# Patient Record
(demographics unavailable — no encounter records)

---

## 2024-10-09 NOTE — DISCUSSION/SUMMARY
[FreeTextEntry1] : Fatigue Probably secondary to the patient's sleep apnea.  Cardiac sarcoidosis Minimal disease demonstrated by cardiac MRI 11/2022.  Cardiomyopathy Continue carvedilol, Entresto, spironolactone, and Farxiga   paroxysmal atrial fibrillation.   Continue anticoagulation.  The patient was instructed to call his electrophysiologist to determine if his amiodarone should be restarted.  Amiodarone was discontinued by CHF  RTO 6 months  [EKG obtained to assist in diagnosis and management of assessed problem(s)] : EKG obtained to assist in diagnosis and management of assessed problem(s)

## 2024-10-09 NOTE — HISTORY OF PRESENT ILLNESS
[FreeTextEntry1] : The patient is a 61-year-old white male with a past medical history remarkable for pulmonary and cardiac sarcoidosis, paroxysmal atrial fibrillation, hypertension, hypercholesterolemia, diabetes, and sleep apnea. Due to the patient's fatigue his carvedilol dose was decreased.  His carvedilol dose was decreased.  Laboratories were ordered.  The patient's T3 and T4 were normal.  His electrolytes and CBC were unremarkable aside from glucose intolerance.   The patient's fatigue continues.  He is chest pain and dyspnea free

## 2024-10-09 NOTE — CARDIOLOGY SUMMARY
[de-identified] : Normal sinus rhythm, increased RS ratio V2 [de-identified] : - Sarcoidosis: Pulmonary 2005 and cardiac 2009 - Nonischemic cardiomyopathy: CHF Con recommended right and left cardiac catheterization 5/31/2024 - PHARMACOLOGIC NUCLEAR STRESS TEST: 12/13/2022, nonischemic cardiomyopathy, EF 47% - Cardiac MRI: 11/10/2022, cardiac sarcoidosis with minimal active disease, EF43%, trivial aortic insufficiency,  no significant mitral regurgitation or tricuspid regurgitation - ECHOCARDIOGRAM: 10/4/2023, EF 45 to 50%, trace mitral regurgitation and tricuspid regurgitation, PA systolic 35   - Silicosis of the lung - AICD: 2009 [place], lead fx/Generator change BS single chamber AICD. Reported MRI compatible - hypertension - hypercholesterolemia - PAF: h/o ablation, placed on amiodarone due to recurrent AF - diabetes - EBONIE

## 2025-04-07 NOTE — CARDIOLOGY SUMMARY
[de-identified] : Normal sinus rhythm,, PVCs  [de-identified] : - Sarcoidosis: Pulmonary 2005 and cardiac 2009 - Nonischemic cardiomyopathy: CHF consultation recommended right and left cardiac catheterization 5/31/2024, declined by the patient - PHARMACOLOGIC NUCLEAR STRESS TEST: 12/13/2022, nonischemic cardiomyopathy, EF 47% - Cardiac MRI: 11/10/2022, cardiac sarcoidosis with minimal active disease, EF43%, trivial aortic insufficiency,  no significant mitral regurgitation or tricuspid regurgitation - ECHOCARDIOGRAM: 10/4/2023, EF 45 to 50%, trace mitral regurgitation and tricuspid regurgitation, PA systolic 35   - Silicosis of the lung - AICD: 2009 [place], lead fx/Generator change BS single chamber AICD. Reported MRI compatible - hypertension - hypercholesterolemia - PAF: h/o ablation, placed on amiodarone due to recurrent AF - diabetes - EBONIE

## 2025-04-07 NOTE — HISTORY OF PRESENT ILLNESS
[FreeTextEntry1] : The patient is a 61-year-old white male with a past medical history remarkable for pulmonary and cardiac sarcoidosis, paroxysmal atrial fibrillation, hypertension, hypercholesterolemia, diabetes, and sleep apnea. The patient reports continued dyspnea without chest pain.  CHF consultation was obtained and right left cardiac catheterizations were recommended.  The patient has declined those procedures. Laboratory results requested from his PCP

## 2025-04-07 NOTE — DISCUSSION/SUMMARY
[FreeTextEntry1] : Dyspnea on exertion An echocardiogram was ordered to rule out a cardiomyopathy or valvular abnormality as the etiology of his dyspnea on exertion Since right and left heart catheterizations were declined by the patient, I recommended a coronary CTA to rule out obstructive coronary artery disease as the etiology of the patient's dyspnea on exertion.    Cardiac sarcoidosis Minimal disease demonstrated by cardiac MRI 11/2022.  Cardiomyopathy Continue carvedilol, Entresto, spironolactone, and Farxiga   paroxysmal atrial fibrillation.   Continue anticoagulation.  The patient was instructed to call his electrophysiologist to determine if his amiodarone should be restarted.  Amiodarone was discontinued by CHF  RTO after testing  [EKG obtained to assist in diagnosis and management of assessed problem(s)] : EKG obtained to assist in diagnosis and management of assessed problem(s)

## 2025-05-01 NOTE — END OF VISIT
[Time Spent: ___ minutes] : I have spent [unfilled] minutes of time on the encounter which excludes teaching and separately reported services. [FreeTextEntry3] : CT reviewed on PACS, PFTs reviewed with the patient

## 2025-05-01 NOTE — PROCEDURE
[FreeTextEntry1] : 5/1/2025: Pulmonary function test-spirometry is normal with an FVC of 5.3 L (123%) and FEV1 of 4.25 L (127%).  Lung volumes demonstrate a vital capacity of 5.72 L (136%), TLC of 7.10 L (115%), and FRC of 3.28 L (95%).  Diffusion capacity is 23.4 mL/mmHg/min (86%) with a specific DL of 3.89 mL/mmHg/min/L (90%).  When compared to previous studies of 12/2/2022 there is no significant change except for some improved lung volumes.  Questionable minor degree of air trapping seen in the difference between slow vital And FVC.  Improvements seen in spirometry and should be secondary to the decrease of almost 30 pounds since the time of visits last study.

## 2025-05-01 NOTE — CONSULT LETTER
[Dear  ___] : Dear  [unfilled], [Consult Letter:] : I had the pleasure of evaluating your patient, [unfilled]. [Please see my note below.] : Please see my note below. [Sincerely,] : Sincerely, [DrNick  ___] : Dr. MORENO [FreeTextEntry3] : Dat Granda MD FCCP\par  Pulmonary/Critical Care/Sleep Medicine\par  Department of Internal Medicine\par  \par  [facility] [employer]\par

## 2025-05-01 NOTE — HISTORY OF PRESENT ILLNESS
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  Patient denies any complaints of increased cough, wheeze, sputum, fevers, chills, chest pains.  Patient pending CT angiography of the chest as well as echocardiogram.  [Awakes Unrefreshed] : does not awaken unrefreshed [Awakes with Headache] : does not awaken with headache [Daytime Somnolence] : denies daytime somnolence [Snoring] : no snoring [Tired while Driving] : not tired while driving [TextBox_77] : 11pm [TextBox_79] : 6am [TextBox_81] : 10 [TextBox_89] : 1 [TextBox_93] : restless sleeper [TextBox_100] : 3/22/21 [TextBox_108] : 30 [TextBox_120] : PLM index 28.4 and PLM-arousal index 8.9 [TextBox_125] : 4-16 [TextBox_127] : 3/25/2025 [TextBox_129] : 4/23/2025 [TextBox_133] : 30 [TextBox_137] : 13 [TextBox_141] : 3 [TextBox_143] : 36 [TextBox_147] : 1.1 [TextBox_158] : [employer] [TextBox_160] : N20 [TextBox_162] : 4/12/21 [TextBox_165] : Patient has yet to pursue an oral appliance Pavg 5.4cm H2O [ESS] : 6

## 2025-05-01 NOTE — RESULTS/DATA
[TextEntry] : PATIENT NAME: Domingo Cowart PATIENT ID: [id number] : 1963 DATE OF EXAM: 2024 CT-CHEST NON CONTRAST HISTORY: R06.02 Shortness Of Breath R91.1 Pulmonary Nodule R53.83 Fatigue TECHNIQUE: CT imaging of the chest was performed with multislice axial images acquired at 0.6 mm slice thickness with reconstructions performed in axial, sagittal and coronal planes. This study was performed using automatic exposure control and an iterative reconstruction technique (radiation dose reduction software) to obtain a diagnostic image quality scan with patient dose as low as reasonably achievable. The mA and kV were adjusted according to patient size. The administered radiation dose was 2.51 mSv. No IV contrast was administered. COMPARISON: CT chest dated 2021 FINDINGS: THYROID: Unremarkable. LYMPH NODES: There are calcified hilar and mediastinal lymph nodes compatible with history of sarcoidosis and unchanged. MEDIASTINUM: Unremarkable. HEART: Normal in size. Leads from a cardiac device are noted. PERICARDIUM: There is no pericardial effusion. THORACIC AORTA: Limited evaluation without IV contrast. Grossly unremarkable. PULMONARY ARTERIAL SYSTEM: Limited evaluation without IV contrast. No enlargement. PLEURA: There is no pleural effusion. LUNGS: Status post right upper lobe wedge resection, unchanged. There are faint groundglass nodular opacities in the right upper lobe which appear unchanged and compatible with history of sarcoidosis. There is a 4 mm right upper lobe solid nodule on series 3 image 105 and a 7.3 mm the solid nodule in the left lower lobe on series 3 image 232, both of which appear unchanged. A few tiny left upper lobe lung nodules noted, also unchanged. There is no new or suspicious lung nodule. NAME: Domingo Cowart MRN: [id number] EXAM: CT-CHEST NON CONTRAST Page 2 of 2 AIRWAYS: The central airways are patent. UPPER ABDOMEN: Limited imaging of the abdomen reveals prior cholecystectomy. CHEST WALL: Unremarkable. SUBCUTANEOUS TISSUES: Unremarkable. BONES: Unremarkable. CORONARY CALCIUM FINDINGS: No significant calcium noted within coronary arteries. IMPRESSION: Stable findings of sarcoidosis, unchanged since 2021. Signed by: Caio Martinez MD Signed Date: 2024 12:11 PM EDT SIGNED BY: Caio Martinez M.D., Ext. [phone] 12:11 PM  ECG: Normal sinus rhythm, PVCs   Other: - Sarcoidosis: Pulmonary 2005 and cardiac  - Nonischemic cardiomyopathy: CHF consultation recommended right and left cardiac catheterization 2024, declined by the patient - PHARMACOLOGIC NUCLEAR STRESS TEST: 2022, nonischemic cardiomyopathy, EF 47% - Cardiac MRI: 11/10/2022, cardiac sarcoidosis with minimal active disease, EF43%, trivial aortic insufficiency, no significant mitral regurgitation or tricuspid regurgitation - ECHOCARDIOGRAM: 10/4/2023, EF 45 to 50%, trace mitral regurgitation and tricuspid regurgitation, PA systolic 35 - Silicosis of the lung - AICD:  [place], lead fx/Generator change BS single chamber AICD. Reported MRI compatible

## 2025-05-01 NOTE — DISCUSSION/SUMMARY
[Obstructive Sleep Apnea] : obstructive sleep apnea [Severe] : severe [Responding to Treatment] : responding to treatment [Alcohol Avoidance] : alcohol avoidance [Sedative Avoidance] : sedative avoidance [Intra-Oral Device] : intra-oral device [CPAP] : CPAP [Patient] : discussed with the patient [FreeTextEntry1] : Combined sarcoidosis, nodular silicosis and amiodarone therapy.  Patient does require continued follow-up of his chest CT to evaluate for progressive fibrosis on either account.  [de-identified] : Patient compliant with CPAP/BiPAP and benefiting from therapy [de-identified] : Will reevaluate with repeat home study in light of the patient's weight loss and desire to pursue an oral appliance

## 2025-06-09 NOTE — HISTORY OF PRESENT ILLNESS
[FreeTextEntry1] : The patient is a 61-year-old white male with a past medical history remarkable for pulmonary and cardiac sarcoidosis, paroxysmal atrial fibrillation, hypertension, hypercholesterolemia, diabetes, and sleep apnea with a complaint of dyspnea. Echocardiography was performed on 5/2/2025.  This demonstrated a moderately reduced ejection fraction of 35 to 40%, mild tricuspid regurgitation and a normal PA systolic pressure.  The patient's ejection fraction has decreased when compared to his prior study. A coronary CTA was performed on 5/27/2025.  This demonstrated a coronary artery calcium score of 51 and nonobstructive disease consisting of a proximal mid LAD less than 50%, first diagonal less than 30%, proximal, mid, and distal circumflex less than 50%, and mid RCA less than 30%.  His aortic root was reported to be 4.4 cm in diameter.  It was 3.9 cm by echocardiography.. The patient reports continued dyspnea without chest pain.  CHF consultation was obtained in the past.  Right and left heart cardiac catheterizations were recommended.  The patient had declined those procedures. Recent laboratory results remain unavailable  The patient reports lower extremity myalgias

## 2025-06-09 NOTE — CARDIOLOGY SUMMARY
[de-identified] : Normal sinus rhythm,, PVCs  [de-identified] : - Sarcoidosis: Pulmonary 2005 and cardiac 2009 - Nonischemic cardiomyopathy: CHF consultation recommended right and left cardiac catheterization 5/31/2024, declined by the patient - CAD: CCTA 5/27/2025, CAC 51, pmLAD <50%, D1 <30%, pmdCX <50%, mRCA <30% Aortic root 4.4 cm (3.9 cm by echo 5/2/2025)  (PHARMACOLOGIC NUCLEAR STRESS TEST: 12/13/2022, nonischemic cardiomyopathy, EF 47%) - Cardiac MRI: 11/10/2022, cardiac sarcoidosis with minimal active disease, EF43%, trivial aortic insufficiency,  no significant mitral regurgitation or tricuspid regurgitation - ECHOCARDIOGRAM: 5/2/2025, EF 35 to 40%, trace mitral regurgitation, mild tricuspid regurgitation PA systolic 25 mmHg.  The patient's ejection fraction has decreased.  I will recommend once again that the patient undergo evaluation with the congestive heart failure department (10/4/2023, EF 45 to 50%, trace mitral regurgitation and tricuspid regurgitation, PA systolic 35)   - Silicosis of the lung - AICD: 2009 [place], lead fx/Generator change BS single chamber AICD. Reported MRI compatible - hypertension - hypercholesterolemia - PAF: h/o ablation, placed on amiodarone due to recurrent AF - diabetes - EBONIE

## 2025-06-09 NOTE — DISCUSSION/SUMMARY
[FreeTextEntry1] : Myalgias Fasting lipids requested from the patient's primary care physician.  Hold lovastatin for 2 weeks Reevaluate symptoms in 2 weeks.  If they resolve,  an alternative statin will be prescribed.  If his symptoms continue, lovastatin will be reevaluated and his symptoms will be further evaluated   Dyspnea on exertion Congestive heart failure consultation reordered in view of the patient's moderately reduced ejection fraction and history of sarcoidosis.  The patient is in agreement   Cardiac sarcoidosis Minimal disease demonstrated by cardiac MRI 11/2022.  CHF consultation reordered as noted above  Cardiomyopathy Continue carvedilol, Entresto, spironolactone, and Farxiga for now  paroxysmal atrial fibrillation.   Continue anticoagulation.  The patient was instructed to call his electrophysiologist to determine if his amiodarone should be restarted.  Amiodarone was discontinued by CHF  TTM 2 weeks